# Patient Record
Sex: FEMALE | Race: WHITE | NOT HISPANIC OR LATINO | URBAN - METROPOLITAN AREA
[De-identification: names, ages, dates, MRNs, and addresses within clinical notes are randomized per-mention and may not be internally consistent; named-entity substitution may affect disease eponyms.]

---

## 2018-10-30 ENCOUNTER — APPOINTMENT (RX ONLY)
Dept: URBAN - METROPOLITAN AREA CLINIC 58 | Facility: CLINIC | Age: 28
Setting detail: DERMATOLOGY
End: 2018-10-30

## 2018-10-30 DIAGNOSIS — Z41.9 ENCOUNTER FOR PROCEDURE FOR PURPOSES OTHER THAN REMEDYING HEALTH STATE, UNSPECIFIED: ICD-10-CM

## 2018-10-30 PROBLEM — L70.0 ACNE VULGARIS: Status: ACTIVE | Noted: 2018-10-30

## 2018-10-30 PROCEDURE — ? LIGHTSHEER

## 2018-10-30 ASSESSMENT — LOCATION SIMPLE DESCRIPTION DERM: LOCATION SIMPLE: GROIN

## 2018-10-30 ASSESSMENT — LOCATION DETAILED DESCRIPTION DERM: LOCATION DETAILED: MONS PUBIS

## 2018-10-30 ASSESSMENT — LOCATION ZONE DERM: LOCATION ZONE: VULVA

## 2018-10-30 NOTE — PROCEDURE: LIGHTSHEER
Consent: Written consent obtained, risks reviewed including but not limited to crusting, scabbing, blistering, scarring, darker or lighter pigmentary change, paradoxical hair regrowth, incomplete removal of hair and infection.
Pulse Duration: auto
Fluence: large hand piece 10 small piece 32
External Cooling Fan Speed: 0
Skin Type (Optional): II
Laser Type: Lalo JOHANSEN
Endpoint: Mild erythema applied   Clinicalm
Post-Care Instructions: I reviewed with the patient in detail post-care instructions. Patient should avoid sun for a minimum of 4 weeks before and after treatment.
Hair Color (Optional): Brown
Detail Level: Detailed
Hair Texture (Optional): coarse
Total Pulses: 1